# Patient Record
(demographics unavailable — no encounter records)

---

## 2024-11-01 NOTE — DISCUSSION/SUMMARY
[FreeTextEntry1] : 1. Atrial Fibrillation: I performed a ELBERT guided synchronized electrical cardioversion on 5/9/2022. Patient underwent synchronized electrical cardioversion x 1 (200 joules) and NSR with PACs occurred. When patient was sedated his oxygen saturation dropped and snoring occurred, indicating ERVIN.  Otherwise patient tolerated the procedure well. He returned back to the office  with atrial fibrillation with RVR. Patient was placed on Amiodarone. On 6/20/2022, patient underwent repeat cardioversion. Successful restoration of NSR.  Patient would benefit from long term anticoagulation with Eliquis 5mg BID given [id number] of 2 (HTN and age). Avoid NSAID use given increased chance of bleeding while on NOAC. Continue Toprol XL 50mg daily and Amiodarone 200mg daily (high risk medication with no signs of toxicity). 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 Patient is pending follow up with sleep specialist, but doesn't think he can tolerate CPAP. I encouraged him to follow through with sleep specialist follow up. Patient underwent cardiac catheterization which revealed 20% lesion in RCA, normal coronary arteries elsewhere. Discussed the risks and benefits of treatment with amiodarone. Discussed potential long term side effects, which include thyroid disorder, eye disorder, skin disorder, lung disorder, and liver disorder.  Discussed the need to regularly check thyroid function, PFTs, liver enzymes and have yearly eye examinations given the amiodarone use.  2. HTN: continue amlodipine 10mg daily, Toprol XL at 50mg daily (high risk medication with no signs of toxicity), and will represcribe losartan 100mg daily. Goal BP less than 130/80. Recommend low salt diet.   3. PVCs: frequent on Holter monitor worn on 8/26/2019. Started on Toprol XL 50mg daily (high risk medication with no signs of toxicity).   4. Coronary Artery Disease: patient underwent cardiac catheterization 8/5/2022, which revealed a 20% lesion in the RCA, normal coronaries elsewhere. Recommend starting atorvastatin 20mg daily.    Follow up in 6 months.  [EKG obtained to assist in diagnosis and management of assessed problem(s)] : EKG obtained to assist in diagnosis and management of assessed problem(s)

## 2024-11-01 NOTE — PHYSICAL EXAM
[Normal S1, S2] : normal S1, S2 [No Murmur] : no murmur [Normal] : moves all extremities, no focal deficits, normal speech [de-identified] : No carotid bruits auscultated bilaterally [de-identified] : trace pitting edema bilateral lower extremities.

## 2024-11-01 NOTE — CARDIOLOGY SUMMARY
[de-identified] : 11/1/2024, Sinus Bradycardia, slight IVCD 4/25/2024, NSR, normal ECG 10/26/2023, Sinus Bradycardia with PVC 4/20/2023, NSR with PVC [de-identified] : 6/13/2022, Pharmacologic Nuclear Stress Testing. SEPCT images reveal reversible defect anteriorly and anterolateral apically\par  9/13/2019, Exercise Nuclear Stress Test: Exercised for 6 minutes and 30 seconds. No ischemia on SPECT images.  [de-identified] : 1/25/2024, LV EF 57%, no significant valvular disease 8/26/2019, Trace MR, Trace AI LVEF 56% [de-identified] : 8/5/2022, 20% lesion in RCA, normal coronaries otherwise.

## 2024-11-01 NOTE — HISTORY OF PRESENT ILLNESS
[FreeTextEntry1] : Historical Perspective: This is a 67 year old male with history of HTN presents because of PAC found on his ECG with his PCP. Patient denies any exertional chest pain, SOB, or palpitations. 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 When he is working he denies any exertional chest pain, SOB, palpitations, lightheadedness. No history of syncope. In the past, he did not have very good compliance with his medications. Recently, he started to take his health seriously. He has been compliant with his blood pressure medications with good results. He reports no adverse effects.  ELBERT guided synchronized electrical cardioversion on 5/9/2022. Patient underwent synchronized electrical cardioversion x 1 (200 joules) and NSR with PACs occurred. When patient was sedated his oxygen saturation dropped and snoring occurred, indicating ERVIN. Patient went back to atrial fibrillation. Patient was placed on Amiodarone. On 6/20/2022, patient underwent repeat cardioversion. Successful restoration of NSR.   Current Health Status: Patient with no chest pain, SOB, or palpitations. No hospitalizations since seeing me last. Remains compliant with medications and reports no adverse effects.

## 2025-04-23 NOTE — HISTORY OF PRESENT ILLNESS
[FreeTextEntry1] : Historical Perspective: This is a 69 year old male with history of HTN presents because of PAC found on his ECG with his PCP. Patient denies any exertional chest pain, SOB, or palpitations. 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 When he is working he denies any exertional chest pain, SOB, palpitations, lightheadedness. No history of syncope. In the past, he did not have very good compliance with his medications. Recently, he started to take his health seriously. He has been compliant with his blood pressure medications with good results. He reports no adverse effects.  ELBERT guided synchronized electrical cardioversion on 5/9/2022. Patient underwent synchronized electrical cardioversion x 1 (200 joules) and NSR with PACs occurred. When patient was sedated his oxygen saturation dropped and snoring occurred, indicating ERVIN. Patient went back to atrial fibrillation. Patient was placed on Amiodarone. On 6/20/2022, patient underwent repeat cardioversion. Successful restoration of NSR.   Current Health Status: Patient with no chest pain, SOB, or palpitations. No hospitalizations since seeing me last. Remains compliant with medications and reports no adverse effects. Patient is moving to [place] tomorrow.

## 2025-04-23 NOTE — DISCUSSION/SUMMARY
[FreeTextEntry1] : 1. Atrial Fibrillation: I performed a ELBERT guided synchronized electrical cardioversion on 5/9/2022. Patient underwent synchronized electrical cardioversion x 1 (200 joules) and NSR with PACs occurred. When patient was sedated his oxygen saturation dropped and snoring occurred, indicating ERVIN.  Otherwise patient tolerated the procedure well. He returned back to the office  with atrial fibrillation with RVR. Patient was placed on Amiodarone. On 6/20/2022, patient underwent repeat cardioversion. Successful restoration of NSR.  Patient would benefit from long term anticoagulation with Eliquis 5mg BID given [id number] of 2 (HTN and age). Avoid NSAID use given increased chance of bleeding while on NOAC. Continue Toprol XL 50mg daily and Amiodarone 200mg daily (high risk medication with no signs of toxicity). 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 Patient is pending follow up with sleep specialist, but doesn't think he can tolerate CPAP. I encouraged him to follow through with sleep specialist follow up. Patient underwent cardiac catheterization which revealed 20% lesion in RCA, normal coronary arteries elsewhere. Discussed the risks and benefits of treatment with amiodarone. Discussed potential long term side effects, which include thyroid disorder, eye disorder, skin disorder, lung disorder, and liver disorder.  Discussed the need to regularly check thyroid function, PFTs, liver enzymes and have yearly eye examinations given the amiodarone use.  2. HTN: continue amlodipine 10mg daily, Toprol XL at 50mg daily (high risk medication with no signs of toxicity), and will represcribe losartan 100mg daily. Goal BP less than 130/80. Recommend low salt diet.   3. PVCs: frequent on Holter monitor worn on 8/26/2019. Started on Toprol XL 50mg daily (high risk medication with no signs of toxicity).   4. Coronary Artery Disease: patient underwent cardiac catheterization 8/5/2022, which revealed a 20% lesion in the RCA, normal coronaries elsewhere. Recommend starting atorvastatin 20mg daily.    Patient is moving to [place] tomorrow and will establish doctors there. We can send records upon their request. [EKG obtained to assist in diagnosis and management of assessed problem(s)] : EKG obtained to assist in diagnosis and management of assessed problem(s)

## 2025-04-23 NOTE — CARDIOLOGY SUMMARY
[de-identified] : 4/23/2025, Sinus Bradycardia with small inferior q waves. 11/1/2024, Sinus Bradycardia, slight IVCD 4/25/2024, NSR, normal ECG 10/26/2023, Sinus Bradycardia with PVC 4/20/2023, NSR with PVC [de-identified] : 6/13/2022, Pharmacologic Nuclear Stress Testing. SEPCT images reveal reversible defect anteriorly and anterolateral apically\par  9/13/2019, Exercise Nuclear Stress Test: Exercised for 6 minutes and 30 seconds. No ischemia on SPECT images.  [de-identified] : 1/25/2024, LV EF 57%, no significant valvular disease 8/26/2019, Trace MR, Trace AI LVEF 56% [de-identified] : 8/5/2022, 20% lesion in RCA, normal coronaries otherwise.

## 2025-04-23 NOTE — PHYSICAL EXAM
[Normal S1, S2] : normal S1, S2 [No Murmur] : no murmur [Normal] : moves all extremities, no focal deficits, normal speech [de-identified] : No carotid bruits auscultated bilaterally [de-identified] : trace pitting edema bilateral lower extremities.